# Patient Record
Sex: FEMALE | Race: WHITE | NOT HISPANIC OR LATINO | Employment: FULL TIME | URBAN - METROPOLITAN AREA
[De-identification: names, ages, dates, MRNs, and addresses within clinical notes are randomized per-mention and may not be internally consistent; named-entity substitution may affect disease eponyms.]

---

## 2024-10-11 ENCOUNTER — FLU SHOT (OUTPATIENT)
Dept: INTERNAL MEDICINE | Facility: CLINIC | Age: 29
End: 2024-10-11
Payer: COMMERCIAL

## 2024-10-11 DIAGNOSIS — Z23 NEED FOR INFLUENZA VACCINATION: Primary | ICD-10-CM

## 2024-10-11 PROCEDURE — 90656 IIV3 VACC NO PRSV 0.5 ML IM: CPT | Performed by: INTERNAL MEDICINE

## 2024-10-11 PROCEDURE — 90471 IMMUNIZATION ADMIN: CPT | Performed by: INTERNAL MEDICINE

## 2024-10-16 ENCOUNTER — TELEPHONE (OUTPATIENT)
Dept: PSYCHIATRY | Facility: CLINIC | Age: 29
End: 2024-10-16
Payer: COMMERCIAL

## 2024-10-16 NOTE — TELEPHONE ENCOUNTER
REFERRAL FROM [facility]. CALLED PATIENT TO GET THEM AN APPOINTMENT SCHEDULED. NO ANSWER, LEFT VM

## 2024-10-17 ENCOUNTER — TELEPHONE (OUTPATIENT)
Dept: PSYCHIATRY | Facility: CLINIC | Age: 29
End: 2024-10-17
Payer: COMMERCIAL

## 2024-10-17 NOTE — TELEPHONE ENCOUNTER
Pt had referral from [facility]. Pt called back and has been scheduled with Jes Vaughn on 11/20/2024

## 2024-11-20 ENCOUNTER — TELEMEDICINE (OUTPATIENT)
Dept: PSYCHIATRY | Facility: CLINIC | Age: 29
End: 2024-11-20
Payer: COMMERCIAL

## 2024-11-20 DIAGNOSIS — F31.30 BIPOLAR I DISORDER, MOST RECENT EPISODE DEPRESSED: Primary | ICD-10-CM
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
## 2025-01-15 ENCOUNTER — TELEMEDICINE (OUTPATIENT)
Dept: PSYCHIATRY | Facility: CLINIC | Age: 30
End: 2025-01-15
Payer: COMMERCIAL

## 2025-01-15 DIAGNOSIS — F31.60 BIPOLAR AFFECTIVE DISORDER, MIXED: Primary | ICD-10-CM

## 2025-01-15 NOTE — PROGRESS NOTES
Mode of Visit: Video  Location of patient: -HOME-  Location of provider: +HOME+  You have chosen to receive care through a telehealth visit.  The patient has signed the video visit consent form.  The visit included audio and video interaction. No technical issues occurred during this visit.    Date: 2025  Time In: 4:31 PM  Time out: 04:56 PM    This provider is located at [facility], [address] [place] [address], [facility], using a secure [employer] Video Visit through [employer]. Patient is being seen remotely via telehealth at their home address is located in [place]. Patient stated they are in a secure environment for this session. The patient's condition being diagnosed and treated is appropriate for telemedicine. The provider identified themself as well as their credentials. The patient, or  patient's legal guardian consent to be seen remotely, and when consent is given they understand that the consent allows for patient identifiable information to be sent to a third party as needed. They may refuse to be seen remotely at any time. The electronic data is encrypted and password protected, and the patient's or  legal guardian has been advised of the potential risks to privacy not withstanding such measures.   PT Identifiers used: Name and .    You have chosen to receive care through a telehealth visit.  Do you consent to use a video/audio connection for your medical care today? Yes    Joyce Whalen is a 29 y.o. female who presents today for follow up    Chief Complaint:   Chief Complaint   Patient presents with    Manic Behavior        History of Present Illness:     Bipolar disorder type II  Symptoms are: chronic.   Onset was more than 5 years.   Symptoms occur: daily.  Symptoms have been coming and going since onset.   Treatment and/or Medications comments include: Lamictal, lithium, BuSpar          Clinical Maneuvering/Intervention:      Assisted patient in  "processing above session content; acknowledged and normalized patient’s thoughts, feelings, and concerns.  Rationalized patient thought process regarding concerns presented at session.  Discussed triggers associated with patient's  depression  and manic symptoms  Also discussed coping skills for patient to implement such as mindfulness  and self care     Allowed patient to freely discuss issues without interruption or judgment. Provided safe, confidential environment to facilitate the development of positive therapeutic relationship and encourage open, honest communication. Assisted patient in identifying risk factors which would indicate the need for higher level of care including thoughts to harm self or others and/or self-harming behavior and encouraged patient to contact this office, call 911, or present to the nearest emergency room should any of these events occur. Discussed crisis intervention services and means to access. Patient adamantly and convincingly denies current suicidal or homicidal ideation or perceptual disturbance.    Assessment: Patient reported no active suicidal ideations, self-injurious behaviors, or homicidal ideations.  Patient reported noticed decrease in need for sleep.  Patient reported average appetite.  Therapist continued to use Rogerian focused interventions to build rapport and to provide unconditional positive regard.  Patient reported she feels she is \"manic right now.\"  Patient reported she feels that she has been spending money excessively.  Patient reported she recently discontinued lithium, with the increase in her Lamictal dosage.  Patient and therapist collaborated to create clinical treatment plan in session today.  Patient and therapist discussed patient's concern that she is currently in a manic episode.  Patient and therapist discussed appropriate coping strategies including establishing a daily routine, taking medication as prescribed, and communication with her spouse " to allow for additional support during manic episode.    Patient appears to maintain relative stability as compared to their baseline.  However, patient continues to struggle with   Chief Complaint   Patient presents with    Manic Behavior    which continues to cause impairment in important areas of functioning.  A result, they can be reasonably expected to continue to benefit from treatment and would likely be at increased risk for decompensation otherwise.             MENTAL STATUS EXAM   General Appearance:  Cleanly groomed and dressed and well developed  Eye Contact:  Good eye contact  Attitude:  Cooperative and polite  Motor Activity:  Normal gait, posture  Muscle Strength:  Normal  Speech:  Normal rate, tone, volume  Language:  Stereotypical  Mood and affect:  Normal, pleasant, appropriate, mood congruent and euthymic  Hopelessness:  Denies  Loneliness: Denies  Thought Process:  Logical and goal-directed  Associations/ Thought Content:  No delusions  Hallucinations:  None  Suicidal Ideations:  Not present  Homicidal Ideation:  Not present  Sensorium:  Alert and clear  Orientation:  Person, place, time and situation  Immediate Recall, Recent, and Remote Memory:  Intact  Attention Span/ Concentration:  Good  Fund of Knowledge:  Appropriate for age and educational level  Intellectual Functioning:  Average range  Insight:  Good  Judgement:  Good  Reliability:  Good  Impulse Control:  Good         Patient's Support Network Includes:      Progress toward goal: Not at goal    Prognosis: Good with Ongoing Treatment     Plan: Patient will continue ongoing therapeutic mental health services to assist with stabilization and overall improved quality of life through the use of coping skills and psychoeducation.    Patient will continue in individual outpatient therapy with focus on improved functioning and coping skills, maintaining stability, and avoiding decompensation and the need for higher level of  care.    Patient will adhere to medication regimen as prescribed and report any side effects. Patient will contact this office, call 911 or present to the nearest emergency room should suicidal or homicidal ideations occur. Provide Cognitive Behavioral Therapy and Solution Focused Therapy to improve functioning, maintain stability, and avoid decompensation and the need for higher level of care.     Return in about 4 weeks (around 2/12/2025) for Next scheduled follow up, Video visit.      VISIT DIAGNOSIS:    Diagnosis Plan   1. Bipolar affective disorder, mixed                      This document has been electronically signed by MARILU Shin  January 16, 2025      Part of this note may be an electronic transcription/translation of spoken language to printed text using the Dragon Dictation System.

## 2025-01-16 NOTE — TREATMENT PLAN
"Multi-Disciplinary Problems (from Behavioral Health Treatment Plan)      Active Problems       Problem: Mood Disorder  Start Date: 01/15/25      Problem Details: The patient self-scales this problem as a 8 with 10 being the worst.    Patient has diagnosis of bipolar 2 disorder, mixed.        Goal Priority Start Date Expected End Date End Date    Decrease impulsivity in action- that is, not engaging in self-destructive behavious such as overspending, promiscuity, substance abuse, or use of profane language. High 01/15/25 07/16/25 --    Goal Details: Progress toward goal:  Not appropriate to rate progress toward goal since this is the initial treatment plan.    \" I want to be able to feel more confident in managing my manic episodes and also my depressive episodes when they happen.\"    Patient will self-report overall decrease in average daily impact of mood disorder from 8 out of 10 to at least 6 out of 10 within the next 3 months.        Goal Intervention Frequency Start Date End Date    Provide structure and focus to patients thoughts and action by establishing plans and routine. Q Month 01/15/25 --    Intervention Details: Duration of treatment until remission of symptoms.    Patient will create and implement a routine pattern of daily activities and will review her success of implementation at least once per month with self-reported success in daily implementation within the next 3 months.        Goal Intervention Frequency Start Date End Date    Assist patient in setting reasonable goals and limits on behavior. Q Month 01/15/25 --    Intervention Details: Duration of treatment until remission of symptoms.    Patient will learn and implement at least 3 relapse prevention skills per month, with self-reported mastery of at least 5 total relapse prevention skills within the next 3 months.                               "

## 2025-01-17 ENCOUNTER — OFFICE VISIT (OUTPATIENT)
Dept: INTERNAL MEDICINE | Facility: CLINIC | Age: 30
End: 2025-01-17
Payer: COMMERCIAL

## 2025-01-17 ENCOUNTER — PRIOR AUTHORIZATION (OUTPATIENT)
Dept: INTERNAL MEDICINE | Facility: CLINIC | Age: 30
End: 2025-01-17
Payer: COMMERCIAL

## 2025-01-17 VITALS
BODY MASS INDEX: 28.32 KG/M2 | DIASTOLIC BLOOD PRESSURE: 64 MMHG | SYSTOLIC BLOOD PRESSURE: 120 MMHG | WEIGHT: 170 LBS | HEIGHT: 65 IN | HEART RATE: 91 BPM | OXYGEN SATURATION: 97 %

## 2025-01-17 DIAGNOSIS — F45.8 BRUXISM: ICD-10-CM

## 2025-01-17 DIAGNOSIS — E66.3 OVERWEIGHT WITH BODY MASS INDEX (BMI) OF 28 TO 28.9 IN ADULT: Primary | ICD-10-CM

## 2025-01-17 DIAGNOSIS — E78.00 HYPERCHOLESTEREMIA: ICD-10-CM

## 2025-01-17 DIAGNOSIS — F32.A ANXIETY AND DEPRESSION: ICD-10-CM

## 2025-01-17 DIAGNOSIS — Z13.29 THYROID DISORDER SCREENING: ICD-10-CM

## 2025-01-17 DIAGNOSIS — Z13.1 SCREENING FOR DIABETES MELLITUS: ICD-10-CM

## 2025-01-17 DIAGNOSIS — Z13.21 ENCOUNTER FOR VITAMIN DEFICIENCY SCREENING: ICD-10-CM

## 2025-01-17 DIAGNOSIS — Z13.220 LIPID SCREENING: ICD-10-CM

## 2025-01-17 DIAGNOSIS — F41.9 ANXIETY AND DEPRESSION: ICD-10-CM

## 2025-01-17 DIAGNOSIS — E55.9 VITAMIN D DEFICIENCY: ICD-10-CM

## 2025-01-17 DIAGNOSIS — Z13.0 SCREENING FOR BLOOD DISEASE: ICD-10-CM

## 2025-01-17 DIAGNOSIS — G47.09 OTHER INSOMNIA: ICD-10-CM

## 2025-01-17 DIAGNOSIS — R79.89 ELEVATED PROLACTIN LEVEL: ICD-10-CM

## 2025-01-17 PROCEDURE — 99214 OFFICE O/P EST MOD 30 MIN: CPT | Performed by: NURSE PRACTITIONER

## 2025-01-17 RX ORDER — LAMOTRIGINE 100 MG/1
150 TABLET ORAL DAILY
COMMUNITY

## 2025-01-17 RX ORDER — VILAZODONE HYDROCHLORIDE 40 MG/1
1 TABLET ORAL DAILY
COMMUNITY
Start: 2024-11-06

## 2025-01-17 RX ORDER — LURASIDONE HYDROCHLORIDE 80 MG/1
80 TABLET, FILM COATED ORAL DAILY
COMMUNITY

## 2025-01-17 RX ORDER — PHENTERMINE HYDROCHLORIDE 37.5 MG/1
37.5 TABLET ORAL
Qty: 30 TABLET | Refills: 1 | Status: SHIPPED | OUTPATIENT
Start: 2025-01-17

## 2025-01-17 NOTE — PROGRESS NOTES
"Subjective   Chief Complaint   Patient presents with    Weight Gain     in    Insomnia               Kings Whalen is a 29 y.o. female.    History of Present Illness  Patient has recent weight gain. She has depression and anxiety that is managed by her psychiatrist. She was taking lithium which was causing side effects and weight gain. She weaned off of this mediation which has greatly helped overall. She has elevated prolactin level and elevated LDL of 168. Her depression and anxiety are currently stable. She does have difficulty sleeping and reports jaw tension and grinding her teeth at night. She has discussed with psychiatrist and therapist the possibility of trying phentermine for short time as diet booster to help with energy level and appetite suppression. They both approved of her trying the medication. She denies heart palpitations, chest pain, or shortness of breath.     I have reviewed the following portions of the patient's history and confirmed they are accurate: allergies, current medications, past family history, past medical history, past social history, past surgical history, and problem list    Review of Systems  Pertinent items are noted in HPI.     Current Outpatient Medications on File Prior to Visit   Medication Sig    vilazodone (VIIBRYD) 40 MG tablet tablet Take 1 tablet by mouth Daily.    LaMICtal 100 MG tablet Take 1.5 tablets by mouth Daily.    Lurasidone HCl (LATUDA) 80 MG tablet tablet Take 1 tablet by mouth Daily.     No current facility-administered medications on file prior to visit.       Objective   Vitals:    01/17/25 1254 01/17/25 1317   BP: 120/64    BP Location: Left arm    Patient Position: Sitting    Pulse: 91    SpO2: 97%    Weight: 76.2 kg (168 lb) 77.1 kg (170 lb)   Height: 165 cm (64.96\")      Body mass index is 28.32 kg/m².    Physical Exam  Vitals reviewed.   Constitutional:       Appearance: Normal appearance. She is well-developed.   HENT:      Head: Normocephalic and " "atraumatic.      Nose: Nose normal.   Eyes:      General: Lids are normal.      Conjunctiva/sclera: Conjunctivae normal.      Pupils: Pupils are equal, round, and reactive to light.   Neck:      Thyroid: No thyromegaly.      Trachea: Trachea normal.   Cardiovascular:      Rate and Rhythm: Normal rate and regular rhythm.      Heart sounds: Normal heart sounds.   Pulmonary:      Effort: Pulmonary effort is normal. No respiratory distress.      Breath sounds: Normal breath sounds.   Skin:     General: Skin is warm and dry.   Neurological:      Mental Status: She is alert and oriented to person, place, and time.      GCS: GCS eye subscore is 4. GCS verbal subscore is 5. GCS motor subscore is 6.   Psychiatric:         Attention and Perception: Attention normal.         Mood and Affect: Mood normal.         Speech: Speech normal.         Behavior: Behavior normal. Behavior is cooperative.         Thought Content: Thought content normal. Thought content does not include homicidal or suicidal ideation. Thought content does not include homicidal or suicidal plan.         Results      Lab Results   Component Value Date    WBC 7.07 10/30/2024    HGB 13.4 10/30/2024    HCT 43 10/30/2024    MCV 95 10/30/2024     10/30/2024      Lab Results   Component Value Date    BUN 10 04/18/2022    CREATININE 0.72 04/18/2022     04/18/2022    K 4.5 04/18/2022     04/18/2022    CALCIUM 9.4 04/18/2022    PROTEINTOT 7.1 04/18/2022    ALBUMIN 4.3 04/18/2022    ALT 15 04/18/2022    AST 15 04/18/2022    ALKPHOS 46 04/18/2022    BILITOT <0.2 (L) 04/18/2022    BCR 14 04/18/2022    ANIONGAP 13 04/18/2022      Lab Results   Component Value Date    HGBA1C 4.9 10/30/2024      No results found for: \"CHOL\", \"CHLPL\", \"TRIG\", \"HDL\", \"LDL\", \"LDLDIRECT\"   No results found for: \"TSH\"       Assessment & Plan   Problem List Items Addressed This Visit       Anxiety and depression    Relevant Medications    LaMICtal 100 MG tablet    Lurasidone " HCl (LATUDA) 80 MG tablet tablet    vilazodone (VIIBRYD) 40 MG tablet tablet    phentermine (Adipex-P) 37.5 MG tablet    amitriptyline (ELAVIL) 25 MG tablet    Elevated prolactin level    Relevant Orders    Prolactin    Bruxism    Relevant Medications    amitriptyline (ELAVIL) 25 MG tablet    Other insomnia    Relevant Medications    amitriptyline (ELAVIL) 25 MG tablet    Hypercholesteremia    Overweight with body mass index (BMI) of 28 to 28.9 in adult - Primary    Relevant Medications    phentermine (Adipex-P) 37.5 MG tablet     Other Visit Diagnoses       Screening for blood disease        Relevant Orders    CBC (No Diff)    Comprehensive Metabolic Panel    Lipid Panel    Hemoglobin A1c    TSH Rfx On Abnormal To Free T4    Vitamin B12 & Folate    Vitamin D,25-Hydroxy    Lipid screening        Relevant Orders    Lipid Panel    Screening for diabetes mellitus        Relevant Orders    Hemoglobin A1c    Thyroid disorder screening        Relevant Orders    TSH Rfx On Abnormal To Free T4    Encounter for vitamin deficiency screening        Relevant Orders    Vitamin B12 & Folate    Vitamin D,25-Hydroxy    Vitamin D deficiency        Relevant Orders    Vitamin D,25-Hydroxy               Current Outpatient Medications:     vilazodone (VIIBRYD) 40 MG tablet tablet, Take 1 tablet by mouth Daily., Disp: , Rfl:     amitriptyline (ELAVIL) 25 MG tablet, Take 1-2 tablets by mouth 30 minutes before bedtime as needed for sleep., Disp: 45 tablet, Rfl: 1    LaMICtal 100 MG tablet, Take 1.5 tablets by mouth Daily., Disp: , Rfl:     Lurasidone HCl (LATUDA) 80 MG tablet tablet, Take 1 tablet by mouth Daily., Disp: , Rfl:     phentermine (Adipex-P) 37.5 MG tablet, Take 1 tablet by mouth Every Morning Before Breakfast., Disp: 30 tablet, Rfl: 1    Assessment & Plan  Start phentermine for weight loss. Start amitriptyline for insomnia and bruxism. Continue medication for anxiety and depression with continued follow ups with psychiatrist  and therapist. She will come by for fasting labs and check of prolactin level. Follow low cholesterol, high fiber diet.     Patient was educated on side effects of taking Phentermine medications including risk of addiction, increased heart rate, blood pressure, anxiety, insomnia, and dry mouth. Patient verbalized understanding and wants to continue with this medication. Patient will stop medication and schedule earlier follow up if these symptoms occur. Patient will increase water intake, follow a well balanced diet low in cholesterol/carbs/sugars, decrease portion sizes, and increase physical activity. ILEANA reviewed and appropriate.          Plan of care reviewed with the patient at the conclusion of today's visit.  Education was provided regarding diagnosis, management, and any prescribed or recommended OTC medications.  Patient verbalized understanding of and agreement with management plan.     Return in about 1 month (around 2/17/2025), or if symptoms worsen or fail to improve.      Transcribed from ambient dictation for PRISCILLA Sky by PRISCILLA Sky.  01/17/25   13:20 EST    Patient or patient representative verbalized consent for the use of Ambient Listening during the visit with  PRISCILLA Sky for chart documentation. 1/22/2025  23:17 EST

## 2025-01-20 ENCOUNTER — PATIENT ROUNDING (BHMG ONLY) (OUTPATIENT)
Dept: INTERNAL MEDICINE | Facility: CLINIC | Age: 30
End: 2025-01-20
Payer: COMMERCIAL

## 2025-01-20 NOTE — PROGRESS NOTES
A  my chart message has been sent to the patient for patient rounding with [facility].

## 2025-01-22 PROBLEM — E66.3 OVERWEIGHT WITH BODY MASS INDEX (BMI) OF 28 TO 28.9 IN ADULT: Status: ACTIVE | Noted: 2025-01-22

## 2025-01-22 PROBLEM — E78.00 HYPERCHOLESTEREMIA: Status: ACTIVE | Noted: 2025-01-22

## 2025-01-22 PROBLEM — F32.A ANXIETY AND DEPRESSION: Status: ACTIVE | Noted: 2025-01-22

## 2025-01-22 PROBLEM — G47.09 OTHER INSOMNIA: Status: ACTIVE | Noted: 2025-01-22

## 2025-01-22 PROBLEM — F41.9 ANXIETY AND DEPRESSION: Status: ACTIVE | Noted: 2025-01-22

## 2025-01-22 PROBLEM — F45.8 BRUXISM: Status: ACTIVE | Noted: 2025-01-22

## 2025-01-22 PROBLEM — R79.89 ELEVATED PROLACTIN LEVEL: Status: ACTIVE | Noted: 2025-01-22

## 2025-01-23 ENCOUNTER — PRIOR AUTHORIZATION (OUTPATIENT)
Dept: INTERNAL MEDICINE | Facility: CLINIC | Age: 30
End: 2025-01-23
Payer: COMMERCIAL

## 2025-01-23 DIAGNOSIS — M54.50 LUMBAR PAIN: Primary | ICD-10-CM

## 2025-01-23 RX ORDER — DICLOFENAC SODIUM 75 MG/1
75 TABLET, DELAYED RELEASE ORAL 2 TIMES DAILY PRN
Qty: 60 TABLET | Refills: 1 | Status: SHIPPED | OUTPATIENT
Start: 2025-01-23

## 2025-01-23 RX ORDER — METHOCARBAMOL 500 MG/1
500 TABLET, FILM COATED ORAL 2 TIMES DAILY PRN
Qty: 60 TABLET | Refills: 1 | Status: SHIPPED | OUTPATIENT
Start: 2025-01-23

## 2025-02-12 ENCOUNTER — TELEMEDICINE (OUTPATIENT)
Dept: PSYCHIATRY | Facility: CLINIC | Age: 30
End: 2025-02-12
Payer: COMMERCIAL

## 2025-02-12 DIAGNOSIS — F31.60 BIPOLAR AFFECTIVE DISORDER, MIXED: Primary | ICD-10-CM

## 2025-02-12 NOTE — PROGRESS NOTES
Mode of Visit: Video  Location of patient: -HOME-  Location of provider: +HOME+  You have chosen to receive care through a telehealth visit.  The patient has signed the video visit consent form.  The visit included audio and video interaction. No technical issues occurred during this visit.    Date: 2025  Time In: 4:31 PM  Time out: 04:59 PM    This provider is located at [facility], [address] [place] [address], [place], using a secure May[employer] PAC Video Visit through [employer]. Patient is being seen remotely via telehealth at their home address is located in [place]. Patient stated they are in a secure environment for this session. The patient's condition being diagnosed and treated is appropriate for telemedicine. The provider identified themself as well as their credentials. The patient, or  patient's legal guardian consent to be seen remotely, and when consent is given they understand that the consent allows for patient identifiable information to be sent to a third party as needed. They may refuse to be seen remotely at any time. The electronic data is encrypted and password protected, and the patient's or  legal guardian has been advised of the potential risks to privacy not withstanding such measures.   PT Identifiers used: Name and .    You have chosen to receive care through a telehealth visit.  Do you consent to use a video/audio connection for your medical care today? Yes    Joyce Whalen is a 29 y.o. female who presents today for follow up    Chief Complaint:   Chief Complaint   Patient presents with    Manic Behavior    Depression        History of Present Illness:     Bipolar disorder  Symptoms are: chronic.   Onset was 1 to 5 years.   Symptoms occur: intermittently.  Symptoms have been coming and going since onset.   Other symptom:  Lithium, lamotrigine, lorazepam, Latuda, Wellbutrin, Viibryd  Improvement on treatment was significant.          Clinical  Maneuvering/Intervention:      Assisted patient in processing above session content; acknowledged and normalized patient’s thoughts, feelings, and concerns.  Rationalized patient thought process regarding concerns presented at session.  Discussed triggers associated with patient's  depression  and manic symptoms  Also discussed coping skills for patient to implement such as increasing activity , self care , and positive self talk     Allowed patient to freely discuss issues without interruption or judgment. Provided safe, confidential environment to facilitate the development of positive therapeutic relationship and encourage open, honest communication. Assisted patient in identifying risk factors which would indicate the need for higher level of care including thoughts to harm self or others and/or self-harming behavior and encouraged patient to contact this office, call 911, or present to the nearest emergency room should any of these events occur. Discussed crisis intervention services and means to access. Patient adamantly and convincingly denies current suicidal or homicidal ideation or perceptual disturbance.    Assessment: Patient reported no active suicidal ideations, self-injurious behaviors, or homicidal ideations.  Patient reported noticed improvement in sleeping patterns.  Patient reported appetite is normal at this time.  Therapist continued to use Rogerian focused interventions with patient to build rapport and to provide unconditional positive regard.  Patient reported that she feels she is in a stable.  With her mental health at this time.  Patient reported she has not noticed any symptoms of tan or depression. Patient and therapist continued to explore triggers and warning signs associated with bipolar disorder. Patient identified mood changes, changes in sleep, irritability, and loss of appetite as primary triggers/warnings signs for bipolar disorder. Patient and therapist continued to explore  appropriate coping strategies including relapse prevention skills, identification and replacement of irrational thought processes through the use of cognitive behavioral strategies, daily self-care practices, and daily involvement in previously enjoyed activities.     Patient appears to maintain relative stability as compared to their baseline.  However, patient continues to struggle with   Chief Complaint   Patient presents with    Manic Behavior    Depression    which continues to cause impairment in important areas of functioning.  A result, they can be reasonably expected to continue to benefit from treatment and would likely be at increased risk for decompensation otherwise.      PHQ-Score Total:  PHQ-9 Total Score: 1    KYLER-7 Score Total:  Over the last two weeks, how often have you been bothered by the following problems?  Feeling nervous, anxious or on edge: Not at all  Not being able to stop or control worrying: Several days  Worrying too much about different things: Not at all  Trouble Relaxing: Not at all  Being so restless that it is hard to sit still: Not at all  Becoming easily annoyed or irritable: Not at all  Feeling afraid as if something awful might happen: Not at all  KYLER 7 Total Score: 1  If you checked any problems, how difficult have these problems made it for you to do your work, take care of things at home, or get along with other people: Not difficult at all      MENTAL STATUS EXAM   General Appearance:  Cleanly groomed and dressed and well developed  Eye Contact:  Good eye contact  Attitude:  Cooperative and polite  Motor Activity:  Normal gait, posture  Muscle Strength:  Normal  Speech:  Normal rate, tone, volume  Language:  Stereotypical  Mood and affect:  Normal, pleasant, appropriate, mood congruent and euthymic  Hopelessness:  Denies  Loneliness: Denies  Thought Process:  Logical and goal-directed  Associations/ Thought Content:  No delusions  Hallucinations:  None  Suicidal Ideations:   Not present  Homicidal Ideation:  Not present  Sensorium:  Alert and clear  Orientation:  Person, place, time and situation  Immediate Recall, Recent, and Remote Memory:  Intact  Attention Span/ Concentration:  Good  Fund of Knowledge:  Appropriate for age and educational level  Intellectual Functioning:  Average range  Insight:  Good  Judgement:  Good  Reliability:  Good  Impulse Control:  Good         Patient's Support Network Includes:      Progress toward goal: Not at goal    Prognosis: Good with Ongoing Treatment     Plan: Patient will continue ongoing monthly therapeutic mental health services to assist with stabilization and overall improved quality of life through the use of coping skills and psychoeducation.    Patient will continue in individual outpatient therapy with focus on improved functioning and coping skills, maintaining stability, and avoiding decompensation and the need for higher level of care.    Patient will adhere to medication regimen as prescribed and report any side effects. Patient will contact this office, call 911 or present to the nearest emergency room should suicidal or homicidal ideations occur. Provide Cognitive Behavioral Therapy and Solution Focused Therapy to improve functioning, maintain stability, and avoid decompensation and the need for higher level of care.     Return in about 6 weeks (around 3/26/2025) for Video visit, Next scheduled follow up.      VISIT DIAGNOSIS:    Diagnosis Plan   1. Bipolar affective disorder, mixed                      This document has been electronically signed by MARILU Shin  February 12, 2025      Part of this note may be an electronic transcription/translation of spoken language to printed text using the Dragon Dictation System.

## 2025-02-24 ENCOUNTER — PRIOR AUTHORIZATION (OUTPATIENT)
Dept: INTERNAL MEDICINE | Facility: CLINIC | Age: 30
End: 2025-02-24
Payer: COMMERCIAL

## 2025-03-06 ENCOUNTER — TELEMEDICINE (OUTPATIENT)
Dept: PSYCHIATRY | Facility: CLINIC | Age: 30
End: 2025-03-06
Payer: COMMERCIAL

## 2025-03-06 DIAGNOSIS — F31.60 BIPOLAR AFFECTIVE DISORDER, MIXED: Primary | ICD-10-CM

## 2025-03-06 NOTE — PROGRESS NOTES
Mode of Visit: Video  Location of patient: -WORK-  Location of provider: +HOME+  You have chosen to receive care through a telehealth visit.  The patient has signed the video visit consent form.  The visit included audio and video interaction. No technical issues occurred during this visit.    Date: 2025  Time In: 10:00 AM  Time out: 10:49 AM    This provider is located at [facility], [address] [place] [address], [facility], using a secure Tip or Skip Video Visit through [employer]. Patient is being seen remotely via telehealth at their home address is located in [place]. Patient stated they are in a secure environment for this session. The patient's condition being diagnosed and treated is appropriate for telemedicine. The provider identified themself as well as their credentials. The patient, or  patient's legal guardian consent to be seen remotely, and when consent is given they understand that the consent allows for patient identifiable information to be sent to a third party as needed. They may refuse to be seen remotely at any time. The electronic data is encrypted and password protected, and the patient's or  legal guardian has been advised of the potential risks to privacy not withstanding such measures.   PT Identifiers used: Name and .    You have chosen to receive care through a telehealth visit.  Do you consent to use a video/audio connection for your medical care today? Yes    Joyce Whalen is a 29 y.o. female who presents today for follow up    Chief Complaint:   Chief Complaint   Patient presents with    Manic Behavior    Depression        History of Present Illness:     Bipolar disorder  Symptoms are: chronic.   Onset was 1 to 5 years.   Symptoms occur: intermittently.  Symptoms have been coming and going since onset.   Treatment and/or Medications comments include: Lithium, Latuda, Lamictal, Viibryd, BuSpar, Elavil          Clinical  Maneuvering/Intervention:      Assisted patient in processing above session content; acknowledged and normalized patient’s thoughts, feelings, and concerns.  Rationalized patient thought process regarding concerns presented at session.  Discussed triggers associated with patient's   bipolar disorder  Also discussed coping skills for patient to implement such as self care , positive self talk , and psychoeducation    Allowed patient to freely discuss issues without interruption or judgment. Provided safe, confidential environment to facilitate the development of positive therapeutic relationship and encourage open, honest communication. Assisted patient in identifying risk factors which would indicate the need for higher level of care including thoughts to harm self or others and/or self-harming behavior and encouraged patient to contact this office, call 911, or present to the nearest emergency room should any of these events occur. Discussed crisis intervention services and means to access. Patient adamantly and convincingly denies current suicidal or homicidal ideation or perceptual disturbance.    Assessment: Patient reported no active suicidal ideations, self-injurious behaviors, or homicidal ideations.  Patient reported normal sleeping patterns.  Patient reported healthy appetite.  Therapist continued to use Rogerian focused interventions with patient to build rapport and to provide unconditional positive regard.  Patient and therapist continue to explore triggers for bipolar disorder.  Patient reported she was recently contacted by a cousin on her  mother's side to inform her that her maternal grandmother is passing away.  Patient reported feeling conflicted feelings of apathy, guilt, anger, sadness concerning her relationship with her biological mother's side of the family.  Patient and therapist explored her various feelings associated with her grandmother, as well as patient's rights as a person to  "set healthy boundaries with unhealthy people.  Patient and therapist also discussed patient writing a letter to the grandmother to assist her in expressing her feelings fully, and then destroying the letter.  Patient also informed therapist that she \"decided I felt really good in my mental health and I decreased my Latuda on my own and that caused some problems.\"  Patient and therapist discussed importance of consistent medication management when patient notices she is experiencing stabilization.  Patient reported she has resumed taking her medication daily.    Patient appears to maintain relative stability as compared to their baseline.  However, patient continues to struggle with   Chief Complaint   Patient presents with    Manic Behavior    Depression    which continues to cause impairment in important areas of functioning.  A result, they can be reasonably expected to continue to benefit from treatment and would likely be at increased risk for decompensation otherwise.         MENTAL STATUS EXAM   General Appearance:  Cleanly groomed and dressed and well developed  Eye Contact:  Good eye contact  Attitude:  Cooperative and polite  Motor Activity:  Normal gait, posture  Muscle Strength:  Normal  Speech:  Normal rate, tone, volume  Language:  Stereotypical  Mood and affect:  Normal, pleasant, appropriate, mood congruent and euthymic  Hopelessness:  Denies  Loneliness: Denies  Thought Process:  Logical and goal-directed  Associations/ Thought Content:  No delusions  Hallucinations:  None  Suicidal Ideations:  Not present  Homicidal Ideation:  Not present  Sensorium:  Alert and clear  Orientation:  Person, place, time and situation  Immediate Recall, Recent, and Remote Memory:  Intact  Attention Span/ Concentration:  Good  Fund of Knowledge:  Appropriate for age and educational level  Intellectual Functioning:  Average range  Insight:  Good  Judgement:  Good  Reliability:  Good  Impulse Control:  Good   "       Patient's Support Network Includes:      Progress toward goal: Not at goal    Prognosis: Good with Ongoing Treatment     Plan: Patient reported will continue ongoing therapeutic mental health services to assist with stabilization and overall improved quality of life through the use of coping skills and psychoeducation.    Patient will continue in individual outpatient therapy with focus on improved functioning and coping skills, maintaining stability, and avoiding decompensation and the need for higher level of care.    Patient will adhere to medication regimen as prescribed and report any side effects. Patient will contact this office, call 911 or present to the nearest emergency room should suicidal or homicidal ideations occur. Provide Cognitive Behavioral Therapy and Solution Focused Therapy to improve functioning, maintain stability, and avoid decompensation and the need for higher level of care.     Return in about 3 weeks (around 3/27/2025) for Video visit, Next scheduled follow up.      VISIT DIAGNOSIS:    Diagnosis Plan   1. Bipolar affective disorder, mixed                      This document has been electronically signed by MARILU Shin  March 7, 2025      Part of this note may be an electronic transcription/translation of spoken language to printed text using the Dragon Dictation System.

## 2025-03-25 DIAGNOSIS — R13.10 DYSPHAGIA, UNSPECIFIED TYPE: ICD-10-CM

## 2025-03-25 DIAGNOSIS — R09.89 CHOKING EPISODE: Primary | ICD-10-CM

## 2025-03-26 ENCOUNTER — TELEPHONE (OUTPATIENT)
Dept: PSYCHIATRY | Facility: CLINIC | Age: 30
End: 2025-03-26

## 2025-03-28 DIAGNOSIS — R13.10 DYSPHAGIA, UNSPECIFIED TYPE: ICD-10-CM

## 2025-03-28 DIAGNOSIS — R09.89 CHOKING EPISODE: Primary | ICD-10-CM

## 2025-03-31 DIAGNOSIS — G47.09 OTHER INSOMNIA: ICD-10-CM

## 2025-03-31 DIAGNOSIS — F45.8 BRUXISM: ICD-10-CM

## 2025-04-10 RX ORDER — VALACYCLOVIR HYDROCHLORIDE 1 G/1
1000 TABLET, FILM COATED ORAL 2 TIMES DAILY
Qty: 20 TABLET | Refills: 2 | Status: SHIPPED | OUTPATIENT
Start: 2025-04-10 | End: 2025-04-20

## 2025-04-21 RX ORDER — AMOXICILLIN 875 MG/1
875 TABLET, COATED ORAL 2 TIMES DAILY
Qty: 20 TABLET | Refills: 0 | Status: SHIPPED | OUTPATIENT
Start: 2025-04-21 | End: 2025-05-01

## 2025-05-14 ENCOUNTER — TELEMEDICINE (OUTPATIENT)
Dept: PSYCHIATRY | Facility: CLINIC | Age: 30
End: 2025-05-14
Payer: COMMERCIAL

## 2025-05-14 DIAGNOSIS — F31.60 BIPOLAR AFFECTIVE DISORDER, MIXED: Primary | ICD-10-CM

## 2025-05-14 NOTE — PROGRESS NOTES
Mode of Visit: Video  Location of patient: -HOME-  Location of provider: +HOME+  You have chosen to receive care through a telehealth visit.  The patient has signed the video visit consent form.  The visit included audio and video interaction. No technical issues occurred during this visit.    Date: May 14, 2025  Time In: 1:34 PM  Time out: 1:55 PM    This provider is located at [facility], [address] [place] [address], [facility], using a secure On[employer] Video Visit through [employer]. Patient is being seen remotely via telehealth at their home address is located in [place]. Patient stated they are in a secure environment for this session. The patient's condition being diagnosed and treated is appropriate for telemedicine. The provider identified themself as well as their credentials. The patient, or  patient's legal guardian consent to be seen remotely, and when consent is given they understand that the consent allows for patient identifiable information to be sent to a third party as needed. They may refuse to be seen remotely at any time. The electronic data is encrypted and password protected, and the patient's or  legal guardian has been advised of the potential risks to privacy not withstanding such measures.   PT Identifiers used: Name and .    You have chosen to receive care through a telehealth visit.  Do you consent to use a video/audio connection for your medical care today? Yes    Joyce Whalen is a 29 y.o. female who presents today for follow up    Chief Complaint:   Chief Complaint   Patient presents with    Bipolar disorder        History of Present Illness:     Bipolar disorder  Symptoms are: chronic.   Onset was 1 to 5 years.   Symptoms occur: intermittently.  Symptoms have been coming and going since onset.   Other symptom:  Patient meets DSM-5 diagnostic criteria for bipolar disorder, type II  Treatment and/or Medications comments include: Elavil, Lamictal,  Latuda, Viibryd, Vyvanse, Pristiq, lithium          Clinical Maneuvering/Intervention:      Assisted patient in processing above session content; acknowledged and normalized patient’s thoughts, feelings, and concerns.  Rationalized patient thought process regarding concerns presented at session.  Discussed triggers associated with patient's   bipolar disorder  Also discussed coping skills for patient to implement such as increasing activity , self care , and positive self talk     Allowed patient to freely discuss issues without interruption or judgment. Provided safe, confidential environment to facilitate the development of positive therapeutic relationship and encourage open, honest communication. Assisted patient in identifying risk factors which would indicate the need for higher level of care including thoughts to harm self or others and/or self-harming behavior and encouraged patient to contact this office, call 911, or present to the nearest emergency room should any of these events occur. Discussed crisis intervention services and means to access. Patient adamantly and convincingly denies current suicidal or homicidal ideation or perceptual disturbance.    Assessment: Patient reported no active suicidal ideations, self-injurious behaviors, or homicidal ideations.  Patient reported improved sleeping patterns.  Patient reported average appetite.  Therapist continued to use Rogerian focused interventions with patient to build rapport and to provide unconditional positive regard.  Patient and therapist collaborated to update clinical treatment plan in session today.  Patient and therapist also discussed triggers for both tan and depression with patient identifying she feels that she has stabilized at this time and is not at risk of other depression or tan.  Patient and therapist reviewed previously discussed coping strategies including importance of structure and maintaining a relapse prevention plan in the  event of a manic episode, use of daily self-care practices, increased involvement in enjoyed activities, and positive daily self affirmations.    Patient appears to maintain relative stability as compared to their baseline.  However, patient continues to struggle with   Chief Complaint   Patient presents with    Bipolar disorder    which continues to cause impairment in important areas of functioning.  A result, they can be reasonably expected to continue to benefit from treatment and would likely be at increased risk for decompensation otherwise.        MENTAL STATUS EXAM   General Appearance:  Cleanly groomed and dressed and well developed  Eye Contact:  Good eye contact  Attitude:  Cooperative and polite  Motor Activity:  Normal gait, posture  Muscle Strength:  Normal  Speech:  Normal rate, tone, volume  Language:  Stereotypical  Mood and affect:  Normal, pleasant, appropriate, mood congruent and euthymic  Hopelessness:  Denies  Loneliness: Denies  Thought Process:  Logical and goal-directed  Associations/ Thought Content:  No delusions  Hallucinations:  None  Suicidal Ideations:  Not present  Homicidal Ideation:  Not present  Sensorium:  Alert and clear  Orientation:  Person, place, time and situation  Immediate Recall, Recent, and Remote Memory:  Intact  Attention Span/ Concentration:  Good  Fund of Knowledge:  Appropriate for age and educational level  Intellectual Functioning:  Average range  Insight:  Good  Judgement:  Good  Reliability:  Good  Impulse Control:  Good         Patient's Support Network Includes:   and father    Progress toward goal: Not at goal    Prognosis: Good with Ongoing Treatment     Plan: Patient will continue ongoing therapeutic mental health services to assist with stabilization and overall improved quality of life through the use of coping skills and psychoeducation.    Patient will continue in individual outpatient therapy with focus on improved functioning and coping skills,  maintaining stability, and avoiding decompensation and the need for higher level of care.    Patient will adhere to medication regimen as prescribed and report any side effects. Patient will contact this office, call 911 or present to the nearest emergency room should suicidal or homicidal ideations occur. Provide Cognitive Behavioral Therapy and Solution Focused Therapy to improve functioning, maintain stability, and avoid decompensation and the need for higher level of care.     Return in about 7 weeks (around 7/2/2025) for Video visit, Next scheduled follow up.      VISIT DIAGNOSIS:    Diagnosis Plan   1. Bipolar affective disorder, mixed                      This document has been electronically signed by MARILU Shin  May 14, 2025      Part of this note may be an electronic transcription/translation of spoken language to printed text using the Dragon Dictation System.

## 2025-05-14 NOTE — TREATMENT PLAN
"Multi-Disciplinary Problems (from Behavioral Health Treatment Plan)      Active Problems       Problem: Ineffective Coping  Start Date: 05/14/25      Problem Details: The patient self-scales this problem as a 4 with 10 being the worst.    Patient reported ongoing issues with ineffective coping continue to contribute to mental health concerns.         Goal Priority Start Date Expected End Date End Date    Patient will demonstrate the ability to initiate new constructive life skills consistently. High 05/14/25 11/12/25 --    Goal Details: Progress toward goal:  Not appropriate to rate progress toward goal since this is the initial treatment plan.    \"I want to keep working on maintaining my stability.\"    Patient will self-report overall decrease average daily impact of ineffective coping from 4/10 to at least 3/10 within the next three months.            Goal Intervention Frequency Start Date End Date    Assist patient in identifying healthy coping behaviors. Q Month 05/14/25 --    Intervention Details: Duration of treatment until remission of symptoms.    Patient will learn and implement at least one new coping skill per month with demonstrated mastery of at least three new coping skills within the next three months.                        Reviewed By       Jes Vaughn [facility] Center[facility]AN[facility] 05/14/25 3068                     "

## 2025-06-09 ENCOUNTER — LAB (OUTPATIENT)
Dept: INTERNAL MEDICINE | Age: 30
End: 2025-06-09
Payer: COMMERCIAL

## 2025-06-09 DIAGNOSIS — Z13.21 ENCOUNTER FOR VITAMIN DEFICIENCY SCREENING: ICD-10-CM

## 2025-06-09 DIAGNOSIS — Z13.220 LIPID SCREENING: ICD-10-CM

## 2025-06-09 DIAGNOSIS — Z13.29 THYROID DISORDER SCREENING: ICD-10-CM

## 2025-06-09 DIAGNOSIS — E55.9 VITAMIN D DEFICIENCY: ICD-10-CM

## 2025-06-09 DIAGNOSIS — Z13.0 SCREENING FOR BLOOD DISEASE: ICD-10-CM

## 2025-06-09 DIAGNOSIS — R79.89 ELEVATED PROLACTIN LEVEL: ICD-10-CM

## 2025-06-09 DIAGNOSIS — Z13.1 SCREENING FOR DIABETES MELLITUS: ICD-10-CM

## 2025-06-09 LAB
25(OH)D3 SERPL-MCNC: 55.2 NG/ML (ref 30–100)
ALBUMIN SERPL-MCNC: 4.3 G/DL (ref 3.5–5.2)
ALBUMIN/GLOB SERPL: 1.3 G/DL
ALP SERPL-CCNC: 81 U/L (ref 39–117)
ALT SERPL W P-5'-P-CCNC: 15 U/L (ref 1–33)
ANION GAP SERPL CALCULATED.3IONS-SCNC: 10.4 MMOL/L (ref 5–15)
AST SERPL-CCNC: 21 U/L (ref 1–32)
BILIRUB SERPL-MCNC: 0.3 MG/DL (ref 0–1.2)
BUN SERPL-MCNC: 10 MG/DL (ref 6–20)
BUN/CREAT SERPL: 11.2 (ref 7–25)
CALCIUM SPEC-SCNC: 9.7 MG/DL (ref 8.6–10.5)
CHLORIDE SERPL-SCNC: 101 MMOL/L (ref 98–107)
CHOLEST SERPL-MCNC: 207 MG/DL (ref 0–200)
CO2 SERPL-SCNC: 24.6 MMOL/L (ref 22–29)
CREAT SERPL-MCNC: 0.89 MG/DL (ref 0.57–1)
DEPRECATED RDW RBC AUTO: 42.6 FL (ref 37–54)
EGFRCR SERPLBLD CKD-EPI 2021: 90.7 ML/MIN/1.73
ERYTHROCYTE [DISTWIDTH] IN BLOOD BY AUTOMATED COUNT: 12.2 % (ref 12.3–15.4)
FOLATE SERPL-MCNC: >20 NG/ML (ref 4.78–24.2)
GLOBULIN UR ELPH-MCNC: 3.2 GM/DL
GLUCOSE SERPL-MCNC: 71 MG/DL (ref 65–99)
HBA1C MFR BLD: 5 % (ref 4.8–5.6)
HCT VFR BLD AUTO: 39.8 % (ref 34–46.6)
HDLC SERPL-MCNC: 48 MG/DL (ref 40–60)
HGB BLD-MCNC: 13.2 G/DL (ref 12–15.9)
LDLC SERPL CALC-MCNC: 146 MG/DL (ref 0–100)
LDLC/HDLC SERPL: 3.02 {RATIO}
MCH RBC QN AUTO: 31.4 PG (ref 26.6–33)
MCHC RBC AUTO-ENTMCNC: 33.2 G/DL (ref 31.5–35.7)
MCV RBC AUTO: 94.5 FL (ref 79–97)
PLATELET # BLD AUTO: 316 10*3/MM3 (ref 140–450)
PMV BLD AUTO: 10 FL (ref 6–12)
POTASSIUM SERPL-SCNC: 4.5 MMOL/L (ref 3.5–5.2)
PROLACTIN SERPL-MCNC: 19.1 NG/ML (ref 4.79–23.3)
PROT SERPL-MCNC: 7.5 G/DL (ref 6–8.5)
RBC # BLD AUTO: 4.21 10*6/MM3 (ref 3.77–5.28)
SODIUM SERPL-SCNC: 136 MMOL/L (ref 136–145)
TRIGL SERPL-MCNC: 71 MG/DL (ref 0–150)
TSH SERPL DL<=0.05 MIU/L-ACNC: 4.39 UIU/ML (ref 0.27–4.2)
VIT B12 BLD-MCNC: 657 PG/ML (ref 211–946)
VLDLC SERPL-MCNC: 13 MG/DL (ref 5–40)
WBC NRBC COR # BLD AUTO: 7.7 10*3/MM3 (ref 3.4–10.8)

## 2025-06-09 PROCEDURE — 83036 HEMOGLOBIN GLYCOSYLATED A1C: CPT | Performed by: NURSE PRACTITIONER

## 2025-06-09 PROCEDURE — 84439 ASSAY OF FREE THYROXINE: CPT | Performed by: NURSE PRACTITIONER

## 2025-06-09 PROCEDURE — 36415 COLL VENOUS BLD VENIPUNCTURE: CPT | Performed by: NURSE PRACTITIONER

## 2025-06-09 PROCEDURE — 84146 ASSAY OF PROLACTIN: CPT | Performed by: NURSE PRACTITIONER

## 2025-06-09 PROCEDURE — 84481 FREE ASSAY (FT-3): CPT | Performed by: NURSE PRACTITIONER

## 2025-06-09 PROCEDURE — 80050 GENERAL HEALTH PANEL: CPT | Performed by: NURSE PRACTITIONER

## 2025-06-09 PROCEDURE — 80061 LIPID PANEL: CPT | Performed by: NURSE PRACTITIONER

## 2025-06-09 PROCEDURE — 82746 ASSAY OF FOLIC ACID SERUM: CPT | Performed by: NURSE PRACTITIONER

## 2025-06-09 PROCEDURE — 82306 VITAMIN D 25 HYDROXY: CPT | Performed by: NURSE PRACTITIONER

## 2025-06-09 PROCEDURE — 82607 VITAMIN B-12: CPT | Performed by: NURSE PRACTITIONER

## 2025-06-10 DIAGNOSIS — R79.89 ABNORMAL TSH: Primary | ICD-10-CM

## 2025-06-10 DIAGNOSIS — R79.89 ELEVATED TSH: ICD-10-CM

## 2025-06-10 DIAGNOSIS — E03.8 SUBCLINICAL HYPOTHYROIDISM: ICD-10-CM

## 2025-06-10 LAB
T3FREE SERPL-MCNC: 3.59 PG/ML (ref 2–4.4)
T4 FREE SERPL-MCNC: 1.18 NG/DL (ref 0.92–1.68)

## 2025-06-27 DIAGNOSIS — F45.8 BRUXISM: ICD-10-CM

## 2025-06-27 DIAGNOSIS — G47.09 OTHER INSOMNIA: Primary | ICD-10-CM

## 2025-06-27 DIAGNOSIS — G43.009 MIGRAINE WITHOUT AURA AND WITHOUT STATUS MIGRAINOSUS, NOT INTRACTABLE: ICD-10-CM

## 2025-06-27 RX ORDER — AMITRIPTYLINE HYDROCHLORIDE 10 MG/1
30 TABLET ORAL NIGHTLY
Qty: 270 TABLET | Refills: 1 | Status: SHIPPED | OUTPATIENT
Start: 2025-06-27

## 2025-07-02 ENCOUNTER — TELEMEDICINE (OUTPATIENT)
Dept: PSYCHIATRY | Facility: CLINIC | Age: 30
End: 2025-07-02
Payer: COMMERCIAL

## 2025-07-02 DIAGNOSIS — F41.9 ANXIETY DISORDER, UNSPECIFIED TYPE: Primary | ICD-10-CM

## 2025-07-02 NOTE — PROGRESS NOTES
Mode of Visit: Video  Location of patient: -HOME-  Location of provider: +HOME+  You have chosen to receive care through a telehealth visit.  The patient has signed the video visit consent form.  The visit included audio and video interaction. No technical issues occurred during this visit.    Date: 2025  Time In: 08:00 AM  Time out: 8:39 AM    This provider is located at [facility], [address] [place] [address], [facility], using a secure [employer] Video Visit through [employer]. Patient is being seen remotely via telehealth at their home address is located in [place]. Patient stated they are in a secure environment for this session. The patient's condition being diagnosed and treated is appropriate for telemedicine. The provider identified themself as well as their credentials. The patient, or  patient's legal guardian consent to be seen remotely, and when consent is given they understand that the consent allows for patient identifiable information to be sent to a third party as needed. They may refuse to be seen remotely at any time. The electronic data is encrypted and password protected, and the patient's or  legal guardian has been advised of the potential risks to privacy not withstanding such measures.   PT Identifiers used: Name and .    You have chosen to receive care through a telehealth visit.  Do you consent to use a video/audio connection for your medical care today? Yes    Joyce Whalen is a 29 y.o. female who presents today for follow up    Chief Complaint:   Chief Complaint   Patient presents with    Anxiety        History of Present Illness:   Anxiety  Visit:  Follow-up  Follow-up visit:     Medication compliance:  %    Symptoms: excessive worry, irritability and nervous/anxious      Frequency:  Most days    Severity:  Mild    Sleep quality:  Good    Nighttime awakenings:  Seldom    Treatments tried:  Non-SSRI antidepressants and lifestyle changes     Improvement on treatment:  Significant         Clinical Maneuvering/Intervention:      Assisted patient in processing above session content; acknowledged and normalized patient’s thoughts, feelings, and concerns.  Rationalized patient thought process regarding concerns presented at session.  Discussed triggers associated with patient's  anxiety  Also discussed coping skills for patient to implement such as increasing activity , self care , and healthy boundaries setting    Allowed patient to freely discuss issues without interruption or judgment. Provided safe, confidential environment to facilitate the development of positive therapeutic relationship and encourage open, honest communication. Assisted patient in identifying risk factors which would indicate the need for higher level of care including thoughts to harm self or others and/or self-harming behavior and encouraged patient to contact this office, call 911, or present to the nearest emergency room should any of these events occur. Discussed crisis intervention services and means to access. Patient adamantly and convincingly denies current suicidal or homicidal ideation or perceptual disturbance.    Assessment: Patient reported no active suicidal ideations, self-injurious behaviors, or homicidal ideations.  Patient reported average sleeping patterns.  Patient reported average appetite.  Therapist continued to use Rogerian focused interventions with patient to build rapport and to provide unconditional positive regard.  Patient and therapist explored triggers for anxiety with patient identifying familial stress as primary trigger.  Patient and therapist worked to process this trigger while discussing appropriate coping strategies including healthy boundaries setting, continue daily self-care, and continued involvement in enjoyed activities.    Patient appears to maintain relative stability as compared to their baseline.  However, patient continues to struggle  with   Chief Complaint   Patient presents with    Anxiety    which continues to cause impairment in important areas of functioning.  A result, they can be reasonably expected to continue to benefit from treatment and would likely be at increased risk for decompensation otherwise.    PHQ-9 Depression Screening  Little interest or pleasure in doing things? Several days   Feeling down, depressed, or hopeless? Not at all   PHQ-2 Total Score 1   Trouble falling or staying asleep, or sleeping too much? Not at all   Feeling tired or having little energy? Not at all   Poor appetite or overeating? Not at all   Feeling bad about yourself - or that you are a failure or have let yourself or your family down? Not at all   Trouble concentrating on things, such as reading the newspaper or watching television? Not at all   Moving or speaking so slowly that other people could have noticed? Or the opposite - being so fidgety or restless that you have been moving around a lot more than usual? Not at all     Thoughts that you would be better off dead, or of hurting yourself in some way? Not at all   PHQ-9 Total Score 1   If you checked off any problems, how difficult have these problems made it for you to do your work, take care of things at home, or get along with other people? Not difficult at all           KYLER-7 Score Total:  Over the last two weeks, how often have you been bothered by the following problems?  Feeling nervous, anxious or on edge: Several days  Not being able to stop or control worrying: More than half the days  Worrying too much about different things: More than half the days  Trouble Relaxing: Not at all  Being so restless that it is hard to sit still: Not at all  Becoming easily annoyed or irritable: Several days  Feeling afraid as if something awful might happen: Several days  KYLER 7 Total Score: 7  If you checked any problems, how difficult have these problems made it for you to do your work, take care of things  at home, or get along with other people: Somewhat difficult      MENTAL STATUS EXAM   General Appearance:  Cleanly groomed and dressed and well developed  Eye Contact:  Good eye contact  Attitude:  Cooperative and polite  Motor Activity:  Normal gait, posture  Muscle Strength:  Normal  Speech:  Normal rate, tone, volume  Language:  Stereotypical  Mood and affect:  Normal, pleasant, appropriate, mood congruent and euthymic  Hopelessness:  Denies  Loneliness: Denies  Thought Process:  Logical and goal-directed  Associations/ Thought Content:  No delusions  Hallucinations:  None  Suicidal Ideations:  Not present  Homicidal Ideation:  Not present  Sensorium:  Alert and clear  Orientation:  Person, place, time and situation  Immediate Recall, Recent, and Remote Memory:  Intact  Attention Span/ Concentration:  Good  Fund of Knowledge:  Appropriate for age and educational level  Intellectual Functioning:  Average range  Insight:  Good  Judgement:  Good  Reliability:  Good  Impulse Control:  Good         Patient's Support Network Includes:      Progress toward goal: Not at goal    Prognosis: Good with Ongoing Treatment     Plan: Patient will update clinical treatment plan in next session. Patient will continue ongoing therapeutic mental health services to assist with stabilization and overall improved quality of life through the use of coping skills and psychoeducation.       Patient will continue in individual outpatient therapy with focus on improved functioning and coping skills, maintaining stability, and avoiding decompensation and the need for higher level of care.    Patient will adhere to medication regimen as prescribed and report any side effects. Patient will contact this office, call 911 or present to the nearest emergency room should suicidal or homicidal ideations occur. Provide Cognitive Behavioral Therapy and Solution Focused Therapy to improve functioning, maintain stability, and avoid decompensation  and the need for higher level of care.     Return in about 6 weeks (around 8/13/2025) for Video visit, Next scheduled follow up.      VISIT DIAGNOSIS:    Diagnosis Plan   1. Anxiety disorder, unspecified type                      This document has been electronically signed by MARILU Shin  July 2, 2025      Part of this note may be an electronic transcription/translation of spoken language to printed text using the Dragon Dictation System.